# Patient Record
(demographics unavailable — no encounter records)

---

## 2024-12-23 NOTE — HEALTH RISK ASSESSMENT
[Good] : ~his/her~  mood as  good [No] : In the past 12 months have you used drugs other than those required for medical reasons? 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

## 2024-12-23 NOTE — HISTORY OF PRESENT ILLNESS
[de-identified] : this is a 43 F pmhx  SLE - ARELI- seen today for CPE  was seen as new pt 10/10/23 needs forms for work   Diagnosed with acute/subacute cutaneous lupus in 12/2018 - followed by Rheum q monthly Benlysta q weekly , on Plaquinil 200 and Mycophenolate BID  - ophtho 8/2024

## 2024-12-23 NOTE — ASSESSMENT
[FreeTextEntry1] : HTN-  --no cp sob palpitation or dizzy spells , no leg swelling  -continue current medications lisinopril 20 qd rx renewed  - educated low salt diet , avoid canned , processed and fast food ,chips , bagged items ,  start exercise daily 30- 40 minutes  , loose weight  -f/u 4- 6 weeks check BP  BMI -  40 --> 35 - discussed caloric control , portion control , weight loss, increase physical activity 30 min daily  - will try weight watchers   Diagnosed with acute/subacute cutaneous lupus in 12/2018 - followed by Rheum q monthly 9/13/23 -has f/u 11/6/23  on Plaquinil 200 2 tab qd and prednisone 7.5 qd ( since 2 weeks ) , bactrium M- W _F  - ophtho due -referral placed -last 2021   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  HCM  Mammo- 1/27/24 - bi rad -0 US axilla left 2/5/2024 -Abn LN s/p BX 2/15//24 -Axilla, left, core biopsy: - Lymph node with necrotizing and non-necrotizing granulomatous inflammation PAP- 1/8/2024  Tdap-2017  FLu vaccinne -12/23/24  prevnar 13 7/2023  penumovax 23 -11/2020

## 2024-12-23 NOTE — PHYSICAL EXAM
[No Acute Distress] : no acute distress [Well-Appearing] : well-appearing [Normal Sclera/Conjunctiva] : normal sclera/conjunctiva [PERRL] : pupils equal round and reactive to light [EOMI] : extraocular movements intact [Normal Outer Ear/Nose] : the outer ears and nose were normal in appearance [Normal Oropharynx] : the oropharynx was normal [No JVD] : no jugular venous distention [No Lymphadenopathy] : no lymphadenopathy [Supple] : supple [Thyroid Normal, No Nodules] : the thyroid was normal and there were no nodules present [No Respiratory Distress] : no respiratory distress  [No Accessory Muscle Use] : no accessory muscle use [Clear to Auscultation] : lungs were clear to auscultation bilaterally [Normal Rate] : normal rate  [Regular Rhythm] : with a regular rhythm [Normal S1, S2] : normal S1 and S2 [No Murmur] : no murmur heard [Pedal Pulses Present] : the pedal pulses are present [No Edema] : there was no peripheral edema [No Extremity Clubbing/Cyanosis] : no extremity clubbing/cyanosis [Soft] : abdomen soft [Non Tender] : non-tender [Non-distended] : non-distended [No Masses] : no abdominal mass palpated [No HSM] : no HSM [Normal Bowel Sounds] : normal bowel sounds [Normal Posterior Cervical Nodes] : no posterior cervical lymphadenopathy [Normal Anterior Cervical Nodes] : no anterior cervical lymphadenopathy [No CVA Tenderness] : no CVA  tenderness [No Spinal Tenderness] : no spinal tenderness [No Joint Swelling] : no joint swelling [Grossly Normal Strength/Tone] : grossly normal strength/tone [No Rash] : no rash [Coordination Grossly Intact] : coordination grossly intact [No Focal Deficits] : no focal deficits [Normal Gait] : normal gait [Deep Tendon Reflexes (DTR)] : deep tendon reflexes were 2+ and symmetric [Normal Affect] : the affect was normal [Normal Insight/Judgement] : insight and judgment were intact

## 2025-03-10 NOTE — DATA REVIEWED
[FreeTextEntry1] : Pathology\par  12/14/2020\par  Kidney, needle core biopsy\par  Lupus nephritis, membranous type, ISN/RPS class V (see comment)\par  - No significant endocapillary hypercellularity or cellular\par  crescents are present in glomeruli in this sample\par  - NIH activity index 0/24\par  Summary of chronic changes:\par  - Global glomerulosclerosis (2% of glomeruli)\par  - Tubular atrophy and interstitial fibrosis (less than 5% of\par  cortex)\par  - Mild arterial and arteriolar sclerosis\par  - NIH chronicity index 1/12\par  \par  Renal Doppler 11/10/20\par  IMPRESSION:\par  No evidence of renal vein thrombus\par  \par  Renal US 8/3/20\par  IMPRESSION:\par  No hydronephrosis in either kidney.\par  0.8 cm echogenic lesion in the mid to upper pole of the left kidney. This may represent a small angiomyolipoma. It can be more definitively characterized with dedicated renal MRI as clinically warranted.

## 2025-03-10 NOTE — HISTORY OF PRESENT ILLNESS
[FreeTextEntry1] : 1. SLE: Diagnosed with acute/subacute cutaneous lupus in 12/2018 based on diffuse erythematous rash. On skin biopsy, rash showed perivascular infiltrate of mononuclear cells and extravasated erythrocytes. Subtle papillary edema and subtle interface infiltrate of mononuclear cells. Serologies were positive for [place] 1:640, Sm (5.7), RNP (3.2), dsDNA (23). Normal complements. No renal involvement.  She started following with rheumatology clinic on 1/19, and was started on prednisone 30mg, HCQ 400mg, and steroid shampoo. Still no desired response, so MMF was started in early 2019. However, got lost to f/u, and did not remain on MMF high dose. Pt returned to clinic on 7/19 and was restarted on MMF. Pt's rash overall improved on MMF and steroids. Pt also with alopecia for which she was getting intradermal steroids.  During the rheumatology visit in 09/2019 the recommendation was to increase MMF from 2 to 3g, and prednisone tapered from 7.5mg to 5mg. She did not follow-up until 2020. 2. Lupus Nephritis: In 2020 with increasing proteinuria, she underwent a kidney biopsy, which demonstrated class V LN. She received prednisone 60 mg and  mg bid on 10/20/2020. Repeat UP/Cr 0.1.  She has followed with Dr. Prince; lisinopril has been started.  Prednisone was stopped in early 2021.  3. COVID PNA:  In 3/2020 patient had COVID-PNA (also anosmia, diarrhea). At the time MMF was held. 4. OB history: Has been pregnant 3 times. First was miscarriage, 8 weeks. 1 pregnancy infant normal, but patient had preeclampsia. No blood cloths in past.  5. Hyperlipidemia  Meds MMF 1.5 g 2xd hydroxychloroquine 400 mg/d belimumab 200 mg/week lisinopril 20 mg/d simvastatin 20 mg/d biotin fish oil Ca/vit D B12

## 2025-03-10 NOTE — ASSESSMENT
[FreeTextEntry1] : 1. SLE: Diagnosed with acute/subacute cutaneous lupus in 12/2018 based on diffuse erythematous rash. On skin biopsy, rash showed perivascular infiltrate of mononuclear cells and extravasated erythrocytes. Subtle papillary edema and subtle interface infiltrate of mononuclear cells. Serologies were positive for [place] 1:640, Sm (5.7), RNP (3.2), dsDNA (23). Normal complements. No renal involvement.  She started following with rheumatology clinic on 1/19, and was started on prednisone 30mg, HCQ 400mg, and steroid shampoo. Still no desired response, so MMF was started in early 2019. However, got lost to f/u, and did not remain on MMF high dose. Pt returned to clinic on 7/19 and was restarted on MMF. Pt's rash overall improved on MMF and steroids. Pt also with alopecia for which she was getting intradermal steroids.  During the rheumatology visit in 09/2019 the recommendation was to increase MMF from 2 to 3g, and prednisone tapered from 7.5mg to 5mg. She did not follow-up until 2020. 2. Lupus Nephritis: In 2020 with increasing proteinuria, she underwent a kidney biopsy, which demonstrated class V LN. She received prednisone 60 mg and  mg bid on 10/20/2020. Repeat UP/Cr 0.1.  She has followed with Dr. Prince; lisinopril has been started.  Prednisone was stopped in early 2021.  3. COVID PNA:  In 3/2020 patient had COVID-PNA (also anosmia, diarrhea). At the time MMF was held. 4. OB history: Has been pregnant 3 times. First was miscarriage, 8 weeks. 1 pregnancy infant normal, but patient had preeclampsia. No blood cloths in past.  5. Hyperlipidemia  Plan Lab tests Reviewed internal and/or external records of other providers Contact me In need of Prevnar 20 in fall 2025 Pending labs, tapering of MMF is necessary Systemic Lupus Erythematosus, known as lupus, is a chronic autoimmune disease that can affect any organ in the body posing threats to proper organ function and even to life. Therefore, close surveillance of all bodily functions is required, including but not limited to central and peripheral nervous system, ocular and auditory systems, cardiopulmonary function, kidney function, mucocutaneous and musculoskeletal systems as well as constitutional manifestations. Surveillance consists of history, physical, and laboratory tests. Treatment varies, but most of the drugs used are high risk and therefore also require close monitoring in the form of blood and urine tests. High risk medications used in the treatment of rheumatic diseases include steroids, disease modifying agents, immunosuppressive therapies, antimalarials, biologics, and chemotherapy. Regardless of which drug or class of drug, the potential toxicities of these therapies mandate close monitoring in the form of a history, physical, and laboratory tests. Return one month

## 2025-03-10 NOTE — PHYSICAL EXAM
[General Appearance - In No Acute Distress] : in no acute distress [Sclera] : the sclera and conjunctiva were normal [General Appearance - Well Nourished] : well nourished [Outer Ear] : the ears and nose were normal in appearance [Auscultation Breath Sounds / Voice Sounds] : lungs were clear to auscultation bilaterally [Heart Rate And Rhythm] : heart rate was normal and rhythm regular [Heart Sounds] : normal S1 and S2 [Heart Sounds Gallop] : no gallops [Murmurs] : no murmurs [Heart Sounds Pericardial Friction Rub] : no pericardial rub [Edema] : there was no peripheral edema [Abdomen Soft] : soft [Abdomen Tenderness] : non-tender [Cervical Lymph Nodes Enlarged Posterior Bilaterally] : posterior cervical [Cervical Lymph Nodes Enlarged Anterior Bilaterally] : anterior cervical [Supraclavicular Lymph Nodes Enlarged Bilaterally] : supraclavicular [Abnormal Walk] : normal gait [Motor Tone] : muscle strength and tone were normal [] : no rash [Sensation] : the sensory exam was normal to light touch and pinprick [Motor Exam] : the motor exam was normal [Oriented To Time, Place, And Person] : oriented to person, place, and time [Nail Clubbing] : no clubbing  or cyanosis of the fingernails [Musculoskeletal - Swelling] : no joint swelling seen